# Patient Record
Sex: MALE | Race: WHITE | NOT HISPANIC OR LATINO | Employment: OTHER | URBAN - METROPOLITAN AREA
[De-identification: names, ages, dates, MRNs, and addresses within clinical notes are randomized per-mention and may not be internally consistent; named-entity substitution may affect disease eponyms.]

---

## 2024-06-04 ENCOUNTER — OFFICE VISIT (OUTPATIENT)
Age: 74
End: 2024-06-04
Payer: MEDICARE

## 2024-06-04 ENCOUNTER — APPOINTMENT (OUTPATIENT)
Age: 74
End: 2024-06-04
Payer: MEDICARE

## 2024-06-04 VITALS
TEMPERATURE: 97.8 F | SYSTOLIC BLOOD PRESSURE: 192 MMHG | DIASTOLIC BLOOD PRESSURE: 80 MMHG | BODY MASS INDEX: 34.26 KG/M2 | HEART RATE: 82 BPM | WEIGHT: 213.19 LBS | OXYGEN SATURATION: 96 % | RESPIRATION RATE: 16 BRPM | HEIGHT: 66 IN
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
## 2024-06-04 DIAGNOSIS — S30.0XXA CONTUSION OF BUTTOCK, INITIAL ENCOUNTER: ICD-10-CM
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
## 2024-06-04 PROCEDURE — 99203 OFFICE O/P NEW LOW 30 MIN: CPT

## 2024-06-04 PROCEDURE — G0463 HOSPITAL OUTPT CLINIC VISIT: HCPCS

## 2024-06-04 PROCEDURE — 73502 X-RAY EXAM HIP UNI 2-3 VIEWS: CPT

## 2024-06-04 RX ORDER — CYCLOBENZAPRINE HCL 5 MG
5 TABLET ORAL 3 TIMES DAILY PRN
Qty: 15 TABLET | Refills: 0 | Status: SHIPPED | OUTPATIENT
Start: 2024-06-04

## 2024-06-04 RX ORDER — NAPROXEN 500 MG/1
500 TABLET ORAL 2 TIMES DAILY WITH MEALS
Qty: 30 TABLET | Refills: 0 | Status: SHIPPED | OUTPATIENT
Start: 2024-06-04

## 2024-06-04 NOTE — PROGRESS NOTES
[facility]        NAME: Mike Raymundo is a 74 y.o. male  : 1950    MRN: [id number]  DATE: 2024  TIME: 1:53 PM    Assessment and Plan   Fall, initial encounter [W19.XXXA]  1. Fall, initial encounter  CANCELED: XR hip/pelv 4+ vw left if performed      2. Contusion of buttock, initial encounter  cyclobenzaprine (FLEXERIL) 5 mg tablet    naproxen (Naprosyn) 500 mg tablet        Reviewed and discussed beers criteria with patient. Also recommend he purchase an at home BP monitor in order to keep an eye on his BP as establish care with a PCP.    Patient Instructions   Preliminary reading of left hip/pelvis Xray: No acute fracture  Radiologist will have final reading- if that is different I will call you.    For your pain:  Ice to affected area first 48 hours, heat afterwards. 15 minutes every 3 hours as needed throughout the day  Topical pain medication such as icy/hot, Biofreeze, Salon pas, etc.  Ibuprofen - 600 mg orally every 6-8 hours when required, maximum 2400 mg/day OR Naproxen - 500 mg orally twice daily when required, maximum 1250 mg/day    Acetaminophen - 650 mg orally every 4-6 hours when required, maximum 4000 mg/day  If these medications not effective - then can trial muscle relaxant briefly.    Your blood pressure is significantly elevated - I would recommend you establish care with a primary care provider in order to monitor this with you as high blood pressure can silently be detrimental to your overall health - it increases your risk of having bad outcomes such as strokes, heart attacks, kidney damage significantly if not in the proper range.    Establish care with a Primary Care Provider  Proceed to Emergency Department if symptoms worsen.    If tests have been performed at [facility], our office will contact you with results if changes need to be made to the care plan discussed with you at the visit.  You can review your full results on [facility].    Chief Complaint      Chief Complaint   Patient presents with   • Fall     Pain in LEFT hip and buttocks after falling while moving grass, Sunday afternoon. States that pain hurts when getting up from seated position and when walking. - LOC, - thinners, - headstrike         History of Present Illness       Mike is an active independent 74-year-old male who, 2 days ago, while mowing grass thought he had stepped over a drainage around the grass but missed the step and slipped and fell to his left side.  Since then he has been having left buttock pain that is worse with certain movements.  He has been taking Aleve as well as using heat.  He denies hitting his head or taking any blood thinners.         Review of Systems   Review of Systems   Constitutional:  Negative for chills and fever.   HENT:  Negative for ear pain and sore throat.    Eyes:  Negative for pain and visual disturbance.   Respiratory:  Negative for cough and shortness of breath.    Cardiovascular:  Negative for chest pain and palpitations.   Gastrointestinal:  Negative for abdominal pain and vomiting.   Genitourinary:  Negative for dysuria and hematuria.   Musculoskeletal:  Positive for myalgias (Left buttock). Negative for arthralgias and back pain.   Skin:  Negative for color change and rash.   Neurological:  Negative for dizziness, seizures, syncope, weakness and light-headedness.   All other systems reviewed and are negative.        Current Medications       Current Outpatient Medications:   •  cyclobenzaprine (FLEXERIL) 5 mg tablet, Take 1 tablet (5 mg total) by mouth 3 (three) times a day as needed for muscle spasms, Disp: 15 tablet, Rfl: 0  •  naproxen (Naprosyn) 500 mg tablet, Take 1 tablet (500 mg total) by mouth 2 (two) times a day with meals, Disp: 30 tablet, Rfl: 0    Current Allergies     Allergies as of 06/04/2024   • (No Known Allergies)            The following portions of the patient's history were reviewed and updated as appropriate: allergies,  "current medications, past family history, past medical history, past social history, past surgical history and problem list.     History reviewed. No pertinent past medical history.    Past Surgical History:   Procedure Laterality Date   • FINGER SURGERY Left     pointer   • TONSILLECTOMY     • VASECTOMY         History reviewed. No pertinent family history.      Medications have been verified.        Objective   BP (!) 192/80 (BP Location: Left arm, Patient Position: Sitting, Cuff Size: Standard)   Pulse 82   Temp 97.8 °F (36.6 °C) (Tympanic)   Resp 16   Ht 5' 6\" (1.676 m)   Wt 96.7 kg (213 lb 3 oz)   SpO2 96%   BMI 34.41 kg/m²   No LMP for male patient.       Physical Exam     Physical Exam  Vitals and nursing note reviewed.   Constitutional:       Appearance: Normal appearance.   HENT:      Head: Normocephalic and atraumatic.   Pulmonary:      Effort: Pulmonary effort is normal.   Musculoskeletal:      Lumbar back: No tenderness or bony tenderness. Negative right straight leg raise test and negative left straight leg raise test.      Right hip: Normal.      Left hip: No deformity, tenderness or bony tenderness. Normal range of motion.        Legs:    Skin:     General: Skin is warm and dry.      Capillary Refill: Capillary refill takes less than 2 seconds.   Neurological:      General: No focal deficit present.      Mental Status: He is alert and oriented to person, place, and time. Mental status is at baseline.      Sensory: No sensory deficit.      Motor: No weakness.   Psychiatric:         Mood and Affect: Mood normal.         Behavior: Behavior normal.         Thought Content: Thought content normal.                   "

## 2024-06-04 NOTE — PATIENT INSTRUCTIONS
Preliminary reading of left hip/pelvis Xray: No acute fracture  Radiologist will have final reading- if that is different I will call you.    For your pain:  Ice to affected area first 48 hours, heat afterwards. 15 minutes every 3 hours as needed throughout the day  Topical pain medication such as icy/hot, Biofreeze, Salon pas, etc.  Ibuprofen - 600 mg orally every 6-8 hours when required, maximum 2400 mg/day OR Naproxen - 500 mg orally twice daily when required, maximum 1250 mg/day    Acetaminophen - 650 mg orally every 4-6 hours when required, maximum 4000 mg/day  If these medications not effective - then can trial muscle relaxant briefly.    Your blood pressure is significantly elevated - I would recommend you establish care with a primary care provider in order to monitor this with you as high blood pressure can silently be detrimental to your overall health - it increases your risk of having bad outcomes such as strokes, heart attacks, kidney damage significantly if not in the proper range.    Establish care with a Primary Care Provider.  Proceed to Emergency Department if symptoms worsen.    If tests have been performed at [facility], our office will contact you with results if changes need to be made to the care plan discussed with you at the visit.  You can review your full results on [facility].

## 2024-10-15 ENCOUNTER — APPOINTMENT (OUTPATIENT)
Age: 74
End: 2024-10-15
Payer: MEDICARE

## 2024-10-15 ENCOUNTER — OFFICE VISIT (OUTPATIENT)
Age: 74
End: 2024-10-15
Payer: MEDICARE

## 2024-10-15 VITALS
RESPIRATION RATE: 18 BRPM | SYSTOLIC BLOOD PRESSURE: 172 MMHG | BODY MASS INDEX: 34.23 KG/M2 | DIASTOLIC BLOOD PRESSURE: 80 MMHG | OXYGEN SATURATION: 94 % | HEART RATE: 89 BPM | TEMPERATURE: 97 F | WEIGHT: 212.96 LBS | HEIGHT: 66 IN

## 2024-10-15 DIAGNOSIS — M25.562 ACUTE PAIN OF LEFT KNEE: ICD-10-CM

## 2024-10-15 DIAGNOSIS — Z75.8 DOES NOT HAVE PRIMARY CARE PROVIDER: ICD-10-CM

## 2024-10-15 DIAGNOSIS — M25.562 ACUTE PAIN OF LEFT KNEE: Primary | ICD-10-CM

## 2024-10-15 PROCEDURE — 99213 OFFICE O/P EST LOW 20 MIN: CPT

## 2024-10-15 PROCEDURE — G0463 HOSPITAL OUTPT CLINIC VISIT: HCPCS

## 2024-10-15 PROCEDURE — 73564 X-RAY EXAM KNEE 4 OR MORE: CPT
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
## 2024-10-15 NOTE — PATIENT INSTRUCTIONS
Preliminary reading of left knee X-ray: no acute fracture. The medial tibial plateau appears thinner compared to lateral - this is most likely age related changes but can cause pain in your knee.  Radiologist will have final reading- if that is different I will call you.    Ice to affected area first 48 hours, heat afterwards. 15 minutes every 3 hours as needed throughout the day  Topical pain medication such as icy/hot, Biofreeze, Salon pas, etc.  Ibuprofen - 600 mg orally every 6-8 hours when required, maximum 2400 mg/day   Acetaminophen - 650 mg orally every 4-6 hours when required, maximum 3000 mg/day  You have the pressure reading was elevated in the office today.  Highly recommend that you establish care with a primary care doctor.        Follow up with orthopedics -Call [phone] to make an appointment  Proceed to Emergency Department if symptoms worsen.    If tests have been performed at [facility], our office will contact you with results if changes need to be made to the care plan discussed with you at the visit.  You can review your full results on [facility].

## 2024-10-24 ENCOUNTER — OFFICE VISIT (OUTPATIENT)
Age: 74
End: 2024-10-24
Payer: MEDICARE

## 2024-10-24 VITALS
BODY MASS INDEX: 33.75 KG/M2 | HEIGHT: 66 IN | DIASTOLIC BLOOD PRESSURE: 84 MMHG | SYSTOLIC BLOOD PRESSURE: 176 MMHG | WEIGHT: 210 LBS | HEART RATE: 88 BPM

## 2024-10-24 DIAGNOSIS — M25.562 ACUTE PAIN OF LEFT KNEE: ICD-10-CM

## 2024-10-24 DIAGNOSIS — M25.552 PAIN IN LEFT HIP: ICD-10-CM

## 2024-10-24 DIAGNOSIS — M16.0 PRIMARY OSTEOARTHRITIS OF BOTH HIPS: ICD-10-CM

## 2024-10-24 DIAGNOSIS — M17.12 PRIMARY OSTEOARTHRITIS OF LEFT KNEE: Primary | ICD-10-CM

## 2024-10-24 PROCEDURE — 99203 OFFICE O/P NEW LOW 30 MIN: CPT | Performed by: STUDENT IN AN ORGANIZED HEALTH CARE EDUCATION/TRAINING PROGRAM

## 2024-10-24 NOTE — PROGRESS NOTES
1. Acute pain of left knee  Ambulatory Referral to Orthopedic Surgery        No orders of the defined types were placed in this encounter.       Imaging Studies (I personally reviewed images in PACS and report):    X-ray left knee 10/15/2024: No acute osseous abnormalities.  Evidence of a chronic healed proximal fibular shaft fracture.  Moderate medial tibiofemoral joint space narrowing.  Unremarkable soft tissues.  X-ray left hip 6/4/2024: Moderate degenerative changes of both hips.  Degenerative changes of lumbar spine.    IMPRESSION:  Acute atraumatic left knee pain  Acute atraumatic left lateral hip pain  Currently symptoms have been improving/mainly resolving over the past couple weeks  Primary osteoarthritis of left knee and bilateral hips  Difficulty with transition from sitting to standing due to a sense of instability.  This may be due to the arthritis of his hips and knees or could be secondary to lumbar source as well/lumbar stenosis with neurogenic claudication    PLAN:    Clinical exam and radiographic imaging reviewed with patient today, with impression as per above. I have discussed with the patient the pathophysiology of this diagnosis and reviewed how the examination correlates with this diagnosis.    Imaging obtained/reviewed as per above. I will follow up official radiology interpretation.  Treatment options were discussed at length, including risks and benefits; after discussing these treatment options, the patient elected to defer intra-articular left knee cortisone injection given his pain is been progressively improving and only sparingly uses OTC pain medications.  I counseled the cortisone injection well can help reduce his painful episodes of knee pain and will likely not improve his difficulty when transitioning from sitting to standing and mobility.  I counseled this is typically treated through formal physical therapy.  I did offer referral to PT but he deferred this option.  Other  "conservative treatments could include use of a compression knee sleeve when doing any excessive walking activities as this may also aggravate or trigger his knee pain and arthritis.      Return if symptoms worsen or fail to improve.    Portions of the record may have been created with voice recognition software. Occasional wrong word or \"sound a like\" substitutions may have occurred due to the inherent limitations of voice recognition software. Read the chart carefully and recognize, using context, where substitutions have occurred.     CHIEF COMPLAINT:  Chief Complaint   Patient presents with    Left Knee - Pain         HPI:  Mike Raymundo is a 74 y.o. male  who presents with his daughter for       Visit 10/24/2024:  Initial evaluation of left knee and hip pain:  Reports today actually that he has not been experiencing any pain for the past few days  He states his main concern is that when he is transitioning from sitting to standing he has to do so very slowly.  He states as he is able to start walking he takes small steps until he is able to fully start weightbearing \"normally.\".  He states with prolonged walking or walking on uneven surface it can cause sudden severe shooting pain across his anterior knee that can cause him to feel a sensation of giving out.  However for the most part he feels that he is mainly pain-free.  He states the pain was more aggravating a few weeks ago and he had been taking NSAIDs, acetaminophen consistently.  He states over the past week or 2 he only takes it a couple times.  He states he cannot predict when he is about to give out.  He does not wear any knee braces with activities that typically cause his knee to give out.  Patient denies any numbness or tingling of his lower extremities, bowel/bladder incontinence.  He states he previously had left lateral hip pain/tightness before but this has resolved.  He denies any pain of his anterior hip or groin.  He denies any pain of his " "knee radiating up to his hip.  He does have imaging already of his knee and hip as noted above.      Denies prior surgeries of his left knee or hip in the past.  He has never received an injection of his left knee is wondering whether he would need to today.  He is wanting where the injection would help with his sense of instability and difficulty when transitioning from sitting to standing.  He has not seen formal physical therapy for this issue.      Medical, Surgical, Family, and Social History    History reviewed. No pertinent past medical history.  Past Surgical History:   Procedure Laterality Date    FINGER SURGERY Left     pointer    TONSILLECTOMY      VASECTOMY       Social History   Social History     Substance and Sexual Activity   Alcohol Use Yes    Comment: rare     Social History     Substance and Sexual Activity   Drug Use Never     Social History     Tobacco Use   Smoking Status Never    Passive exposure: Never   Smokeless Tobacco Never     Family History   Problem Relation Age of Onset    No Known Problems Mother     No Known Problems Father      No Known Allergies       Physical Exam  BP (!) 176/84   Pulse 88   Ht 5' 6\" (1.676 m)   Wt 95.3 kg (210 lb)   BMI 33.89 kg/m²     Constitutional:  see vital signs  Gen: well-developed, normocephalic/atraumatic, well-groomed  Eyes: No inflammation or discharge of conjunctiva or lids; sclera clear   Pharynx: no inflammation, lesion, or mass of lips  Pulmonary/Chest: Effort normal. No respiratory distress.     Left Hip Exam     Tenderness   The patient is experiencing no tenderness.     Range of Motion   Flexion:  80   External rotation:  40   Internal rotation: 10     Muscle Strength   Abduction: 5/5   Adduction: 5/5   Flexion: 4/5     Tests   KENN: negative    Comments:  FADIR: Negative  Logroll: Negative          Left Knee Exam:  Erythema: no  Swelling: no  Increased Warmth: no  Tenderness: none  ROM: 0-130  Knee flexion strength: 5/5  Knee extension " strength: 5/5  Patellar Apprehension: negative  Patellar Grind: +  Lachman's: negative  Anterior Drawer: negative  Varus laxity: negative  Valgus laxity: negative  Lenore: negative     No calf tenderness to palpation     Back:  Straight leg raise test: Negative bilaterally      Procedures